# Patient Record
(demographics unavailable — no encounter records)

---

## 2025-04-30 NOTE — PHYSICAL EXAM
[Normal] : soft, non-tender, non-distended, no masses palpated, no HSM and normal bowel sounds [Penis Abnormality] : normal uncircumcised penis [Testes Tenderness] : no tenderness of the testes [Testes Mass (___cm)] : there were no testicular masses [de-identified] : Patient has minimal edema on the right leg and 3+ edema in the left leg.  There is significant hyperpigmentation of the left leg.  Both legs have varicose veins that are visible.  The feet have overgrown toenails and possible onychomycosis.

## 2025-04-30 NOTE — HISTORY OF PRESENT ILLNESS
[FreeTextEntry1] : Patient is here to establish care and for leg swelling. [de-identified] : Patient is here with his mother because he is unable to provide accurate history.  He has a longstanding history of schizophrenia. Patient has no specific complaints other than chronic swelling of his lower extremities, left greater than right.  He states it has been present for more than 2 years.  He has no pain or discomfort. Patient has no dyspnea, orthopnea or PND. No history of hypertension, diabetes or hypercholesterolemia.  Patient has not had a colonoscopy and has not had prostate cancer  screening. Patient's mother reports that he has a penile discharge.  Patient denies dysuria.  He is not sexually active.

## 2025-04-30 NOTE — REVIEW OF SYSTEMS
[Negative] : Neurological [FreeTextEntry9] : Lower extremity edema [de-identified] : History of schizophrenia on risperidone and benztropine

## 2025-05-27 NOTE — END OF VISIT
[] : Fellow [FreeTextEntry3] : The patient was seen and examined at [facility] e [address] [place] [address]. I agree w/ above with the following changes. I have spent 45 minutes on the encounter with the patient, excluding teaching and separately reportedly billable services.  Pt w/ significant schizophrenia, here with his mother. Needs index screening colon. Discussed various CRC screening options at length. Will schedule colonoscopy as per patient preference. Risks/benefits/alternatives, and details of procedure were discussed at length, with explicit consent obtained.

## 2025-05-27 NOTE — PHYSICAL EXAM
[Normal] : the appearance was normal, no neck mass [No Respiratory Distress] : no respiratory distress [No Acc Muscle Use] : no accessory muscle use [Respiration, Rhythm And Depth] : normal respiratory rhythm and effort [Normal Color / Pigmentation] : normal skin color and pigmentation [de-identified] : regular rate [de-identified] : verbose

## 2025-05-27 NOTE — ASSESSMENT
[FreeTextEntry1] : 49 yo M, hx of Schizophrenia, here for initial evaluation for CRC Screening  recent blood work including cbc cmp reviewed   Plan: #CRC Screening, index discussed option of stool based testing, patient's mother wishes to proceed with Colonoscopy we discussed importance of proper bowel cleanse golytely prep at [facility] Discussed Risks / Benefits / Alternatives to Colon Cancer Screening, including endoscopic risks of bleeding, infection, perforation, or missed lesion Discussed Diet and Bowel Prep leading up to procedure Discussed need for chaperone post procedurally  #Mild elevation of ALP - monitor and repeat labs, patient/mother deferred repeat blood work today - consider Alk Phos isoenzymes next blood work  seen and dw Dr. Mihcelle Alanis MD GI Fellow [facility]

## 2025-05-27 NOTE — HISTORY OF PRESENT ILLNESS
[FreeTextEntry1] : 49 yo M, hx of Schizophrenia, here for initial evaluation for CRC Screening  here with mother, patient is moderately verbose  referred by Dr. Bynum  no GI symptoms  MedHx: Schizophrenia SurgHx: denies prior COL Medications: risperdal, benztropene All: NKDA SocHx: lives with mother in [place] FHx: denies FHX of CRC, Esophageal or Gastric Ca EGD: n/a Colon: n/a